# Patient Record
Sex: MALE | Race: WHITE | URBAN - METROPOLITAN AREA
[De-identification: names, ages, dates, MRNs, and addresses within clinical notes are randomized per-mention and may not be internally consistent; named-entity substitution may affect disease eponyms.]

---

## 2017-02-03 ENCOUNTER — OFFICE (OUTPATIENT)
Dept: URBAN - METROPOLITAN AREA CLINIC 13 | Facility: CLINIC | Age: 77
End: 2017-02-03

## 2017-02-03 VITALS
DIASTOLIC BLOOD PRESSURE: 81 MMHG | WEIGHT: 186 LBS | HEIGHT: 73 IN | SYSTOLIC BLOOD PRESSURE: 122 MMHG | HEART RATE: 80 BPM

## 2017-02-03 DIAGNOSIS — K59.00 CONSTIPATION: ICD-10-CM

## 2017-02-03 DIAGNOSIS — K21.9 GERD: ICD-10-CM

## 2017-02-03 PROCEDURE — 99212 OFFICE O/P EST SF 10 MIN: CPT | Performed by: INTERNAL MEDICINE

## 2017-02-03 NOTE — SERVICEHPINOTES
The constipation has improved dramatically.  He continues to take Metamucil daily but has stopped Miralax.  He had one episode of fecal incontinence.  Esomeprazole relieved his GERD, and he has just stopped it as of 2 days ago without any repercussions so far.  His cough completely resolved.  They have an apartment in [place] and are spending more and more of their time there, with plans eventually to see their house here.

## 2017-06-21 ENCOUNTER — OFFICE (OUTPATIENT)
Dept: URBAN - METROPOLITAN AREA CLINIC 13 | Facility: CLINIC | Age: 77
End: 2017-06-21

## 2017-06-21 VITALS
HEART RATE: 78 BPM | HEIGHT: 73 IN | WEIGHT: 178 LBS | DIASTOLIC BLOOD PRESSURE: 67 MMHG | SYSTOLIC BLOOD PRESSURE: 107 MMHG

## 2017-06-21 DIAGNOSIS — K59.00 CONSTIPATION: ICD-10-CM

## 2017-06-21 DIAGNOSIS — Z86.010 PERSONAL HISTORY COLON POLYPS: ICD-10-CM

## 2017-06-21 DIAGNOSIS — K21.9 GERD: ICD-10-CM

## 2017-06-21 PROCEDURE — 99212 OFFICE O/P EST SF 10 MIN: CPT | Performed by: INTERNAL MEDICINE

## 2017-06-21 NOTE — SERVICEHPINOTES
He had been having having more heartburn, so he has been back on Nexium for about a month.  Pepcid helps but "not as much as Nexium."  He just started taking Nexium OTC as of today.  His constipation is under control with Metamucil 2 per day, and he has not needed Miralax.  He has put his house here on the market with plans eventually to move to [place].

## 2023-03-23 ENCOUNTER — OUTPATIENT (OUTPATIENT)
Dept: OUTPATIENT SERVICES | Facility: HOSPITAL | Age: 83
LOS: 1 days | End: 2023-03-23

## 2023-03-23 DIAGNOSIS — Z00.00 ENCOUNTER FOR GENERAL ADULT MEDICAL EXAMINATION WITHOUT ABNORMAL FINDINGS: ICD-10-CM
